# Patient Record
Sex: MALE | Race: WHITE
[De-identification: names, ages, dates, MRNs, and addresses within clinical notes are randomized per-mention and may not be internally consistent; named-entity substitution may affect disease eponyms.]

---

## 2020-09-28 LAB
ALBUMIN SERPL BCP-MCNC: 4.3 G/DL (ref 3.4–5)
ALBUMIN/GLOB SERPL: 1.2 {RATIO} (ref 1.1–1.5)
ALP SERPL-CCNC: 114 IU/L (ref 46–116)
ALT SERPL W P-5'-P-CCNC: 32 U/L (ref 30–65)
ANION GAP SERPL CALCULATED.3IONS-SCNC: 10 MMOL/L (ref 8–16)
APTT PPP: 27 SECONDS (ref 22–32)
AST SERPL W P-5'-P-CCNC: 18 U/L (ref 10–37)
BASOPHILS # BLD AUTO: 0 X10'3 (ref 0–0.2)
BASOPHILS NFR BLD AUTO: 0.4 % (ref 0–1)
BILIRUB SERPL-MCNC: 0.7 MG/DL (ref 0–1)
BUN SERPL-MCNC: 22 MG/DL (ref 7–18)
BUN/CREAT SERPL: 13.8 (ref 5.4–32)
CALCIUM SERPL-MCNC: 9.3 MG/DL (ref 8.5–10.1)
CHLORIDE SERPL-SCNC: 107 MMOL/L (ref 99–107)
CO2 SERPL-SCNC: 27.1 MMOL/L (ref 24–32)
CREAT SERPL-MCNC: 1.59 MG/DL (ref 0.6–1.1)
EOSINOPHIL # BLD AUTO: 0.1 X10'3 (ref 0–0.9)
EOSINOPHIL NFR BLD AUTO: 1.2 % (ref 0–6)
ERYTHROCYTE [DISTWIDTH] IN BLOOD BY AUTOMATED COUNT: 13.9 % (ref 11.5–14.5)
GFR SERPL CREATININE-BSD FRML MDRD: 43 ML/MIN
GLUCOSE SERPL-MCNC: 123 MG/DL (ref 70–104)
HBA1C MFR BLD: 6.1 % (ref 4.5–6.2)
HCT VFR BLD AUTO: 44.1 % (ref 42–52)
HGB BLD-MCNC: 14.7 G/DL (ref 14–17.9)
INR PPP: 1 INR
LYMPHOCYTES # BLD AUTO: 1.6 X10'3 (ref 1.1–4.8)
LYMPHOCYTES NFR BLD AUTO: 20.5 % (ref 21–51)
MCH RBC QN AUTO: 29.8 PG (ref 27–31)
MCHC RBC AUTO-ENTMCNC: 33.4 G/DL (ref 33–36.5)
MCV RBC AUTO: 89 FL (ref 78–98)
MONOCYTES # BLD AUTO: 0.5 X10'3 (ref 0–0.9)
MONOCYTES NFR BLD AUTO: 5.7 % (ref 2–12)
NEUTROPHILS # BLD AUTO: 5.8 X10'3 (ref 1.8–7.7)
NEUTROPHILS NFR BLD AUTO: 72.2 % (ref 42–75)
PLATELET # BLD AUTO: 242 X10'3 (ref 140–440)
PMV BLD AUTO: 8.7 FL (ref 7.4–10.4)
POTASSIUM SERPL-SCNC: 4 MMOL/L (ref 3.4–5.1)
PROT SERPL-MCNC: 7.8 G/DL (ref 6.4–8.2)
PROTHROMBIN TIME: 10.4 SECONDS (ref 9–12)
RBC # BLD AUTO: 4.95 X10'6 (ref 4.7–6.1)
SODIUM SERPL-SCNC: 144 MMOL/L (ref 135–145)

## 2020-10-02 ENCOUNTER — HOSPITAL ENCOUNTER (INPATIENT)
Dept: HOSPITAL 94 - PAS IN | Age: 70
LOS: 1 days | Discharge: HOME | DRG: 39 | End: 2020-10-03
Attending: SURGERY | Admitting: SURGERY
Payer: MEDICARE

## 2020-10-02 VITALS — DIASTOLIC BLOOD PRESSURE: 68 MMHG | SYSTOLIC BLOOD PRESSURE: 161 MMHG

## 2020-10-02 VITALS — SYSTOLIC BLOOD PRESSURE: 125 MMHG | DIASTOLIC BLOOD PRESSURE: 63 MMHG

## 2020-10-02 VITALS — SYSTOLIC BLOOD PRESSURE: 167 MMHG | DIASTOLIC BLOOD PRESSURE: 101 MMHG

## 2020-10-02 VITALS — DIASTOLIC BLOOD PRESSURE: 77 MMHG | SYSTOLIC BLOOD PRESSURE: 148 MMHG

## 2020-10-02 VITALS — SYSTOLIC BLOOD PRESSURE: 147 MMHG | DIASTOLIC BLOOD PRESSURE: 82 MMHG

## 2020-10-02 VITALS — WEIGHT: 233.69 LBS | BODY MASS INDEX: 29.06 KG/M2 | HEIGHT: 75 IN

## 2020-10-02 VITALS — SYSTOLIC BLOOD PRESSURE: 158 MMHG | DIASTOLIC BLOOD PRESSURE: 72 MMHG

## 2020-10-02 VITALS — SYSTOLIC BLOOD PRESSURE: 151 MMHG | DIASTOLIC BLOOD PRESSURE: 77 MMHG

## 2020-10-02 VITALS — SYSTOLIC BLOOD PRESSURE: 155 MMHG | DIASTOLIC BLOOD PRESSURE: 85 MMHG

## 2020-10-02 VITALS — SYSTOLIC BLOOD PRESSURE: 188 MMHG | DIASTOLIC BLOOD PRESSURE: 91 MMHG

## 2020-10-02 VITALS — DIASTOLIC BLOOD PRESSURE: 78 MMHG | SYSTOLIC BLOOD PRESSURE: 153 MMHG

## 2020-10-02 VITALS — DIASTOLIC BLOOD PRESSURE: 101 MMHG | SYSTOLIC BLOOD PRESSURE: 167 MMHG

## 2020-10-02 VITALS — DIASTOLIC BLOOD PRESSURE: 67 MMHG | SYSTOLIC BLOOD PRESSURE: 158 MMHG

## 2020-10-02 VITALS — SYSTOLIC BLOOD PRESSURE: 131 MMHG | DIASTOLIC BLOOD PRESSURE: 64 MMHG

## 2020-10-02 VITALS — DIASTOLIC BLOOD PRESSURE: 88 MMHG | SYSTOLIC BLOOD PRESSURE: 178 MMHG

## 2020-10-02 VITALS — DIASTOLIC BLOOD PRESSURE: 80 MMHG | SYSTOLIC BLOOD PRESSURE: 156 MMHG

## 2020-10-02 VITALS — DIASTOLIC BLOOD PRESSURE: 84 MMHG | SYSTOLIC BLOOD PRESSURE: 172 MMHG

## 2020-10-02 VITALS — DIASTOLIC BLOOD PRESSURE: 69 MMHG | SYSTOLIC BLOOD PRESSURE: 149 MMHG

## 2020-10-02 VITALS — SYSTOLIC BLOOD PRESSURE: 154 MMHG | DIASTOLIC BLOOD PRESSURE: 96 MMHG

## 2020-10-02 VITALS — SYSTOLIC BLOOD PRESSURE: 148 MMHG | DIASTOLIC BLOOD PRESSURE: 76 MMHG

## 2020-10-02 VITALS — SYSTOLIC BLOOD PRESSURE: 138 MMHG | DIASTOLIC BLOOD PRESSURE: 77 MMHG

## 2020-10-02 VITALS — SYSTOLIC BLOOD PRESSURE: 156 MMHG | DIASTOLIC BLOOD PRESSURE: 77 MMHG

## 2020-10-02 VITALS — SYSTOLIC BLOOD PRESSURE: 164 MMHG | DIASTOLIC BLOOD PRESSURE: 77 MMHG

## 2020-10-02 VITALS — DIASTOLIC BLOOD PRESSURE: 75 MMHG | SYSTOLIC BLOOD PRESSURE: 161 MMHG

## 2020-10-02 DIAGNOSIS — I65.21: Primary | ICD-10-CM

## 2020-10-02 LAB — APTT PPP: > 139 SECONDS (ref 22–32)

## 2020-10-02 PROCEDURE — 71046 X-RAY EXAM CHEST 2 VIEWS: CPT

## 2020-10-02 PROCEDURE — 87081 CULTURE SCREEN ONLY: CPT

## 2020-10-02 PROCEDURE — 83036 HEMOGLOBIN GLYCOSYLATED A1C: CPT

## 2020-10-02 PROCEDURE — 03CK0ZZ EXTIRPATION OF MATTER FROM RIGHT INTERNAL CAROTID ARTERY, OPEN APPROACH: ICD-10-PCS | Performed by: SURGERY

## 2020-10-02 PROCEDURE — 85025 COMPLETE CBC W/AUTO DIFF WBC: CPT

## 2020-10-02 PROCEDURE — 86885 COOMBS TEST INDIRECT QUAL: CPT

## 2020-10-02 PROCEDURE — 36415 COLL VENOUS BLD VENIPUNCTURE: CPT

## 2020-10-02 PROCEDURE — 88304 TISSUE EXAM BY PATHOLOGIST: CPT

## 2020-10-02 PROCEDURE — 93005 ELECTROCARDIOGRAM TRACING: CPT

## 2020-10-02 PROCEDURE — 85610 PROTHROMBIN TIME: CPT

## 2020-10-02 PROCEDURE — 95816 EEG AWAKE AND DROWSY: CPT

## 2020-10-02 PROCEDURE — 80053 COMPREHEN METABOLIC PANEL: CPT

## 2020-10-02 PROCEDURE — 87635 SARS-COV-2 COVID-19 AMP PRB: CPT

## 2020-10-02 PROCEDURE — 86901 BLOOD TYPING SEROLOGIC RH(D): CPT

## 2020-10-02 PROCEDURE — 85730 THROMBOPLASTIN TIME PARTIAL: CPT

## 2020-10-02 PROCEDURE — 86900 BLOOD TYPING SEROLOGIC ABO: CPT

## 2020-10-02 PROCEDURE — 82948 REAGENT STRIP/BLOOD GLUCOSE: CPT

## 2020-10-02 PROCEDURE — 95813 EEG EXTND MNTR 61-119 MIN: CPT

## 2020-10-02 PROCEDURE — 03UK0KZ SUPPLEMENT RIGHT INTERNAL CAROTID ARTERY WITH NONAUTOLOGOUS TISSUE SUBSTITUTE, OPEN APPROACH: ICD-10-PCS | Performed by: SURGERY

## 2020-10-02 PROCEDURE — 88305 TISSUE EXAM BY PATHOLOGIST: CPT

## 2020-10-02 RX ADMIN — DEXTROSE, SODIUM CHLORIDE, AND POTASSIUM CHLORIDE SCH MLS/HR: 5; .45; .15 INJECTION INTRAVENOUS at 21:26

## 2020-10-02 RX ADMIN — DEXTROSE, SODIUM CHLORIDE, AND POTASSIUM CHLORIDE SCH MLS/HR: 5; .45; .15 INJECTION INTRAVENOUS at 11:35

## 2020-10-02 RX ADMIN — Medication SCH MLS/HR: at 20:30

## 2020-10-02 NOTE — NUR
Problems reprioritized. Patient report given, questions answered & plan of care reviewed 
with Lidia MULTANI. Patient to be transferred to ACCE unit room 315. Patient's art line d/c'd 
per MD order.

## 2020-10-02 NOTE — NUR
No void since transfer to unit. No urge to pee. No oral intake. Refused water; states 
painful to swallow. Bladder scanned: 16ml found. Will cont. to monitor.

## 2020-10-02 NOTE — NUR
Received from OR via ICU BED , accompanied by Anesthesiologist LEORA and report given by 
Anesthesiolgist. PATIENT WITH 20G PIV IN LEFT UE RUNNING LR  AS WELL AS ADAN AND 
NIPRIDE. VSS AT THIS TIME. RIGHT NECK DRESSING IS CDI. ELZA MAINTAINING SUCTION WITH 
SEROSANGUENOUS DRAINAGE PRESENT. PUSH PULLS, , SMILE SYMMETRICAL AND TONGUE IS MIDLINE. 
SCDS ON, DENIES PAIN AT THIS TIME.


-------------------------------------------------------------------------------

Addendum: 10/02/20 at 1123 by Jay Wise RN, RN

-------------------------------------------------------------------------------

Amended: Links added.

## 2020-10-02 NOTE — NUR
[facility] I have received report from   CONSTANTINO MULTANI and had the opportunity to 
ask questions and assume patient care. IF PICC RN IS NOT ABLE TO INSERT 2ND IV, PHARMACY 
NOTED THAT IV FLUIDS ARE NOT COMPATIBLE. WILL ALTERNATE ANCEF WITH K AND FLUIDS IF NEEDED.

-------------------------------------------------------------------------------

Addendum: 10/03/20 at 0454 by Lidia Pollock RN

-------------------------------------------------------------------------------

MD AWARE OF HYPERTENSION, BRADYCARDIA. PATIENT IS STABLE, NO S/S OF DIZZYNESS, SYNCOPE, SOB. 
PATIENT ALERT AND ORIENTED, GOOD DISPOSITION, ANSWERING ALL QUESTIONS APPROPRIATELY. WILL 
CONTINUE TO MONITOR NEURO STATUS AND VS CHECKS FOR ABNORMAL RESULTS.

## 2020-10-02 NOTE — NUR
135 BLOOD GLUCOSE IN RR

-------------------------------------------------------------------------------

Addendum: 10/02/20 at 1130 by Jay Wise RN RN

-------------------------------------------------------------------------------

Amended: Links added.

## 2020-10-02 NOTE — NUR
Problems reprioritized. Patient report given, questions answered & plan of care reviewed 
with Diandra MULTANI.

## 2020-10-02 NOTE — NUR
PATIENT TAKEN TO WITH ALL BELONGINGS AND HOOKED UP TO MONITORS IN ROOM AND REPORT GIVEN TO 
RN WHO HAS TAKEN OVER PATIENT CARE. BED LOW, CALL LIGHT IN REACH, VSS, DRESSINGS CDI. 
PATIENT WITH BLOODY DRAINAGE WITHIN ELZA- MAINTAINING SUCTION. NO DRAINAGE TO DRESSING. 
NEUROLOGICALLY STILL INTACT. VSS. SCDS ON, PIKE CATHETER REMOVED PER MD GORMAN ORDERS. 
DENIES PAIN. CARE TURNED OVER TO RAQUEL.


-------------------------------------------------------------------------------

Addendum: 10/02/20 at 1215 by Jay Wise RN, RN

-------------------------------------------------------------------------------

Amended: Links added.

## 2020-10-03 VITALS — DIASTOLIC BLOOD PRESSURE: 87 MMHG | SYSTOLIC BLOOD PRESSURE: 172 MMHG

## 2020-10-03 VITALS — SYSTOLIC BLOOD PRESSURE: 193 MMHG | DIASTOLIC BLOOD PRESSURE: 97 MMHG

## 2020-10-03 VITALS — DIASTOLIC BLOOD PRESSURE: 79 MMHG | SYSTOLIC BLOOD PRESSURE: 162 MMHG

## 2020-10-03 VITALS — SYSTOLIC BLOOD PRESSURE: 169 MMHG | DIASTOLIC BLOOD PRESSURE: 91 MMHG

## 2020-10-03 LAB
BASOPHILS # BLD AUTO: 0 X10'3 (ref 0–0.2)
BASOPHILS NFR BLD AUTO: 0.3 % (ref 0–1)
EOSINOPHIL # BLD AUTO: 0.1 X10'3 (ref 0–0.9)
EOSINOPHIL NFR BLD AUTO: 0.6 % (ref 0–6)
ERYTHROCYTE [DISTWIDTH] IN BLOOD BY AUTOMATED COUNT: 14.2 % (ref 11.5–14.5)
HCT VFR BLD AUTO: 35.9 % (ref 42–52)
HGB BLD-MCNC: 12.1 G/DL (ref 14–17.9)
LYMPHOCYTES # BLD AUTO: 1.4 X10'3 (ref 1.1–4.8)
LYMPHOCYTES NFR BLD AUTO: 14.8 % (ref 21–51)
MCH RBC QN AUTO: 30.1 PG (ref 27–31)
MCHC RBC AUTO-ENTMCNC: 33.8 G/DL (ref 33–36.5)
MCV RBC AUTO: 89.1 FL (ref 78–98)
MONOCYTES # BLD AUTO: 0.8 X10'3 (ref 0–0.9)
MONOCYTES NFR BLD AUTO: 8.1 % (ref 2–12)
NEUTROPHILS # BLD AUTO: 7.1 X10'3 (ref 1.8–7.7)
NEUTROPHILS NFR BLD AUTO: 76.2 % (ref 42–75)
PLATELET # BLD AUTO: 182 X10'3 (ref 140–440)
PMV BLD AUTO: 8.3 FL (ref 7.4–10.4)
RBC # BLD AUTO: 4.02 X10'6 (ref 4.7–6.1)
WBC # BLD AUTO: 9.4 X10'3 (ref 4.5–11)

## 2020-10-03 RX ADMIN — DEXTROSE, SODIUM CHLORIDE, AND POTASSIUM CHLORIDE SCH MLS/HR: 5; .45; .15 INJECTION INTRAVENOUS at 09:42

## 2020-10-03 RX ADMIN — DEXTROSE, SODIUM CHLORIDE, AND POTASSIUM CHLORIDE SCH MLS/HR: 5; .45; .15 INJECTION INTRAVENOUS at 01:38

## 2020-10-03 RX ADMIN — Medication SCH MLS/HR: at 00:38

## 2020-10-03 NOTE — NUR
DR. GORMAN CALLED, UPDATE GIVEN - MINIMAL OUTPT FROM ELZA DRAIN <25mL SINCE SURGERY. NEW 
ORDER RECEIVED: REMOVE ELZA DRAIN. MD INFORMED HE WILL ROUND AFTER LUNCH WITH ANTICIPATION OF 
DISCHARGE HOME.

## 2020-10-03 NOTE — NUR
Pt has remained stable. Discharge orders given by MD. Discharge instructions gine to pt and 
he stated understanding. IV D/C ed No s/s of complications noted. All belongings accounted 
for. Pt taken via wheelchair to private vehicle.

## 2020-10-03 NOTE — NUR
Problems reprioritized. Patient report given, questions answered & plan of care reviewed 
with CONSTANTINO MULTANI.

## 2020-10-03 NOTE — NUR
Patient in room . I have received report from Lidia George   and had the opportunity to 
ask questions and assume patient care.

## 2020-10-03 NOTE — NUR
ELZA Drain removed from R neck per order, it had no drainage thus far and minimal on NOC 
shift. PT tolerated well. Site had no s/s of complications. Pressure type dressing applied 
over it.

## 2020-10-06 NOTE — NUR
Case Management DC follow up: Spoke w/pt via telephone. s/p: R endarterectomy.  Pt 
Reports:"doing pretty good"  Denies: Acute/continuous CP, emergent SOB, resp distress, 
orthopnea, dyspnea, N/V, hematemesis, weakness, vertigo, syncope episodes, orthostatic 
hypotension, HA, blurry vision, s/s stroke/FAST, dysphagia, bladder pain, dysuria, polyuria, 
hematuria, retention, abd pain/distention, hematochezia, melena, unexplained bruising, 
bleeding, fever, chills. Denies pain, s/s infection to surg site, dsg CDI per pt. Verbalizes 
understanding of Rx, why prescribed; continues/resumes current Rx as ordered, denies ase r/t 
polypharmacy. Verbalizes compliance w/aftercare. Verbalizes understanding of s/s that 
warrant 9-11/ER visit for further evaluation. Pt acknowledges need to schedule/confirm/keep 
follow up appt w/PCP/Db [facility] in November. [place] 10/07/20 1130.  Needs met, 
questions/concerns addressed at DC; No further questions/concerns r/t recent hospital stay 
and/or DC status at this time.